# Patient Record
Sex: FEMALE
[De-identification: names, ages, dates, MRNs, and addresses within clinical notes are randomized per-mention and may not be internally consistent; named-entity substitution may affect disease eponyms.]

---

## 2024-10-15 ENCOUNTER — P2P PATIENT RECORD (OUTPATIENT)
Age: 34
End: 2024-10-15

## 2024-11-12 ENCOUNTER — DASHBOARD ENCOUNTERS (OUTPATIENT)
Age: 34
End: 2024-11-12

## 2024-11-12 ENCOUNTER — OFFICE VISIT (OUTPATIENT)
Dept: URBAN - METROPOLITAN AREA CLINIC 94 | Facility: CLINIC | Age: 34
End: 2024-11-12
Payer: COMMERCIAL

## 2024-11-12 VITALS
HEART RATE: 70 BPM | DIASTOLIC BLOOD PRESSURE: 68 MMHG | BODY MASS INDEX: 19.5 KG/M2 | OXYGEN SATURATION: 98 % | SYSTOLIC BLOOD PRESSURE: 105 MMHG | WEIGHT: 144 LBS | TEMPERATURE: 98.4 F | HEIGHT: 72 IN

## 2024-11-12 DIAGNOSIS — Z15.01 LI-FRAUMENI SYNDROME: ICD-10-CM

## 2024-11-12 DIAGNOSIS — Z12.11 ENCOUNTER FOR SCREENING COLONOSCOPY: ICD-10-CM

## 2024-11-12 DIAGNOSIS — Z91.89 HIGH RISK FOR COLON CANCER: ICD-10-CM

## 2024-11-12 PROCEDURE — 99204 OFFICE O/P NEW MOD 45 MIN: CPT | Performed by: INTERNAL MEDICINE

## 2024-11-12 NOTE — HPI-TODAY'S VISIT:
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   Pt denies any GI sx at this time. No lower abd pain, constipation, diarrhea, blood in stool. No unintentional wt loss, epigastric pain, n/v, melena, dysphagia. No hx of WANG.   Does have hx of Li Fraumeni Syndrome which places pt at significantly high risk for many different cancer types. She did have CLS in 2016 with [facility] that revealed a low grade adenocarcinma in recto-sigmoid region - did have partial colectomy in 2016 with surgeon in [place].   Sister dx,  with stage IV CRC at age 22.   2016 EGD: Normal esophagus.  Mild erythema - reactive gastropathy. Normal duodenum. 2016 CLS: 1.75 cm polyp - distal sigmoid adenocarcinoma, mod differentiated arising in an adenomatous polyp. IH  HPI [facility] following CLS: Patient subsequently visited the ER after calling our office on the morning of  for fever of unknown origin and post-procedural abdominal discomfort. ACT A/P and CXR performed at that time showed no acute findings, including evidence of perforation or metastatic disease, and she was discharged home with a prescription of Cipro and Flagyl for empirical treatment of a WBC of 12.6. This was consistentwith post-polypectomy syndrome and has resolved.

## 2024-11-14 ENCOUNTER — TELEPHONE ENCOUNTER (OUTPATIENT)
Dept: URBAN - METROPOLITAN AREA CLINIC 94 | Facility: CLINIC | Age: 34
End: 2024-11-14

## 2024-11-26 ENCOUNTER — TELEPHONE ENCOUNTER (OUTPATIENT)
Dept: URBAN - METROPOLITAN AREA CLINIC 94 | Facility: CLINIC | Age: 34
End: 2024-11-26

## 2024-11-26 RX ORDER — SODIUM SULFATE, MAGNESIUM SULFATE, AND POTASSIUM CHLORIDE 17.75; 2.7; 2.25 G/1; G/1; G/1
12 TABLETS TABLET ORAL
Qty: 24 TABLETS | Refills: 0 | OUTPATIENT
Start: 2024-11-26 | End: 2024-11-27

## 2024-12-03 ENCOUNTER — OFFICE VISIT (OUTPATIENT)
Dept: URBAN - METROPOLITAN AREA SURGERY CENTER 17 | Facility: SURGERY CENTER | Age: 34
End: 2024-12-03

## 2024-12-17 ENCOUNTER — OFFICE VISIT (OUTPATIENT)
Dept: URBAN - METROPOLITAN AREA CLINIC 94 | Facility: CLINIC | Age: 34
End: 2024-12-17

## 2024-12-17 ENCOUNTER — OFFICE VISIT (OUTPATIENT)
Dept: URBAN - METROPOLITAN AREA SURGERY CENTER 17 | Facility: SURGERY CENTER | Age: 34
End: 2024-12-17

## 2025-01-07 ENCOUNTER — OFFICE VISIT (OUTPATIENT)
Dept: URBAN - METROPOLITAN AREA CLINIC 94 | Facility: CLINIC | Age: 35
End: 2025-01-07